# Patient Record
Sex: FEMALE | Race: WHITE | URBAN - METROPOLITAN AREA
[De-identification: names, ages, dates, MRNs, and addresses within clinical notes are randomized per-mention and may not be internally consistent; named-entity substitution may affect disease eponyms.]

---

## 2017-05-10 ENCOUNTER — TELEPHONE (OUTPATIENT)
Dept: BEHAVIORAL HEALTH | Facility: CLINIC | Age: 42
End: 2017-05-10

## 2017-05-10 NOTE — TELEPHONE ENCOUNTER
5-10-17 Client called to schedule Gambling Eval scheduled for 5-16-17 @ 1300.  She will need assistance filling out forms due to Parkinson's. Génesis AKINS Said this would be no problem, there is very little paperwork.    [place], [place]  No Gambling  attends [employer]ambling [employer]    Every month, once a month $1,000 each time.  For several years now.    Legal: Bankruptcy, recently     Mh: Depression, Anxiety  Therapist: Joon And Miguelinaoc.  Meds: Sertraline, and ??  Psychiatrist: Dr Amilcar Pelletier  In [place]. Has appt. Tomorrow.  Medical: Parkinsons    Requested bennies.fb

## 2017-05-16 ENCOUNTER — BEH TREATMENT PLAN (OUTPATIENT)
Dept: BEHAVIORAL HEALTH | Facility: CLINIC | Age: 42
End: 2017-05-16

## 2017-05-16 ENCOUNTER — HOSPITAL ENCOUNTER (OUTPATIENT)
Dept: BEHAVIORAL HEALTH | Facility: CLINIC | Age: 42
Discharge: HOME | End: 2017-05-16
Attending: SOCIAL WORKER | Admitting: SOCIAL WORKER
Payer: COMMERCIAL

## 2017-05-16 PROCEDURE — 90791 PSYCH DIAGNOSTIC EVALUATION: CPT

## 2017-05-16 ASSESSMENT — ANXIETY QUESTIONNAIRES
1. FEELING NERVOUS, ANXIOUS, OR ON EDGE: SEVERAL DAYS
5. BEING SO RESTLESS THAT IT IS HARD TO SIT STILL: NOT AT ALL
7. FEELING AFRAID AS IF SOMETHING AWFUL MIGHT HAPPEN: NOT AT ALL
4. TROUBLE RELAXING: MORE THAN HALF THE DAYS
3. WORRYING TOO MUCH ABOUT DIFFERENT THINGS: NEARLY EVERY DAY
6. BECOMING EASILY ANNOYED OR IRRITABLE: NEARLY EVERY DAY
GAD7 TOTAL SCORE: 11
2. NOT BEING ABLE TO STOP OR CONTROL WORRYING: MORE THAN HALF THE DAYS

## 2017-05-16 ASSESSMENT — PAIN SCALES - GENERAL: PAINLEVEL: NO PAIN (0)

## 2017-05-16 NOTE — PROGRESS NOTES
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 criteria for alcohol/drug use disorder have been met at any time during a period of 12 months or longer (with the exception that Criterion A4,  Craving or strong desire or urge to use alcohol/drug  may be met).   Specify if:   This additional specifier is used if the individual is in an environment where access to alcohol is restricted.    Mild: Presence of 2-3 symptoms    Moderate: Presence of 4-5 symptoms    Severe: Presence of 6 or more symptoms    SOGS: 12 DIGS: 9 CAGE-AID: 0 GILBERTO-7: 11 PHQ-9: 9       COLLATERAL DATA:  Collateral data was gathered from her mother via tel. Her mother reported that Belem's   her about a year or so after he she was diagnosed with Parkinson's disease. Prior to the divorce Belem was spending an unknown amount of time gambling. She did go through bankruptcy after the divorce because of her gambling as well as poor money management by she and her ex. Her mom did manage her money for a long time from about 2014 - 2016, because Belem has always been a poor . He mother found that very stressful and did not want to do that again. They have now worked out a plan with the aid of her counselor wherein Belem will come to her mother's 1x a month,  "pay all her bills and then be given a very modest allowance for living expenses.That way she can be held accountable by her mother, yet her mother doesn't have to do the bill paying. Mom reported that Belem gets $1800 a month after taxes from long term disability and $800 a month from spousal support. Mom said that Belem owes her about $5k. Her mother says that her ex-bf, Romaine, was a real \"salt of the earth\" shahab who really loved Belem but broke of the relationship because her gambling was a threat to his 20 years of sobriety from alcohol. It is uncertain if he will still be interested if Belem gets clean.     Her mom was concerned because Belem has so much unstructured time in her life. Maggie is concerned that Belem doesn't eat, sleep or exercise as much as she should in order to better manage her Parkinson's disease. Her mom feels that she still hasn't fully accepted her illness, and the Parkinson's can cause her to be more depressed. Her mom sees Belem's biggest relapse triggers as: loneliness, boredom, not accepting her Parkinson's disease, poor money management skills and access to money.     Mental Status Assessment    Physical Appearance/Attire:  Appears stated age, left eye doesn't track right eye  Hygiene:  well groomed  Eye Contact:  at examiner  Speech:  regular  Speech Volume:  regular  Speech Quality: fluid  Cognitive/Perceptual:  reality based  Cognition:  memory intact   Judgment:  intact  Insight:  intact  Orientation:  time, place, person and situation  Thought:  logical   Hallucinations:  none  General Behavioral Tone:  cooperative  Psychomotor Activity:  no problem noted  Gait:  no problem  Mood:  normal  Affect:  congruence/appropriate      Vulnerable Adult Checklist for OUTPATIENTS     1.  Do you have a physical, emotional or mental infirmity or dysfunction?       Yes (explain) - Parkinson's.    2.  Does this issue impair your ability to provide for your own care without help, including providing " yourself with food, shelter, clothing, healthcare or supervision?       No    3.  Because of this issue, I need assistance to protect myself from maltreatment by others.      No    Based on the above information:    This person is not a functional Vulnerable Adult according to [place] Statute 626.5572 subdivision 21.             This person has a history of abuse, but is assessed as stable and not in need of an individual abuse prevention plan beyond the program abuse prevention plan.    Category Severity (ICD-10 Code / DSM 5 Code)   Gambling Disorder Severe  (F63.0) (312.31)   Alcohol Use Disorder NA   Cannabis Use Disorder NA   Hallucinogen Use Disorder NA   Inhalant Use Disorder NA   Opioid Use Disorder NA   Sedative, Hypnotic, or Anxiolytic Use Disorder NA   Stimulant Related Disorder NA   Tobacco Use Disorder NA   Other (or unknown) Substance Use Disorder NA     Suicide Screening Questions:    1. Are you feeling hopeless about the present/future?   No  Feeling ashamed of her gambling   2. Have you ever had thoughts about taking your life?   No   3. When did you have these thoughts? NA   4. Do you have any current intent or active desire to take your life?   No   5. Do you have a plan to take your life?    No   6. Have you ever made a suicide attempt?   No   7. Do you have access to pills, guns or other methods to kill yourself?   No       Risk Status - Use as Guide/Example    Ideation - Active  Thoughts of suicide Intent to follow  Through on suicide Plan for completing  suicide    Yes No Yes No Yes No   Emergent X  X  X    Urgent / Non-Emergent X  X   X   Non-Urgent X   X  X   No Current / Active Risk (Past 6 Months)  X  X  X   Belem Orozco Ibrahim No No No       Additional Risk Factors: Significant history of having untreated or poorly treated mental health symptoms  A recent loss that was significant to the patient, i.e. loss of job, loss of home, divorce, break-up, etc.  Parkinson's disease which lead to her  "quitting work and going on long term disability.    Protective Factors:  Having people in his/her life that would prevent the patient from considering committing suicide (i.e. young children, spouse, parents, etc.)     Risk Status:    Emergent? No  Urgent / Non-Emergent?  No  Present / Non- Urgent? Yes, Document in Epic / SBAR to counselor, Collaborate with patient / client to develop \"Patient Safety Plan\", Referral to PCP or psychiatrist, Address in Treatment Plan, Continuous monitoring, assessment and intervention and Address in Discharge / Transition Plan      No Current Risk? See above    Additional information to support suicide risk rating: See Above    Summary of ASAM Placement Criteria      Intoxication and Withdrawal: 0  Biomedical:  1  Emotional and Behavioral:  2  Readiness to Change:  1  Relapse Potential: 3  Recovery Environmental:  2      Evaluation Summary and Plan   's Recommendation    Abstain from all forms of gambling, including \"free\" games , and online/melody games.  Refrain from entering all types of caty establishments.  Self-ban with the help of a trusted other from all local casinos/card rooms, cut up players cards and have all gambling mail stopped.  Abstain from all mood-altering chemicals unless prescribed by a licensed provider.  Complete the evening outpatient compulsive gambling treatment program at [facility].   Complete Orientation and begin group on Thursday 5/18/17.  Attend [place] 12-step, cultural, spiritual, other supportive community meeting on a weekly basis.   Have someone you check in with weekly who will hold you accountable.   Remain law abiding and follow all recommendations of the courts/PO.      Initial problem list:    The patient lacks relapse prevention skills  The patient has poor coping skills  The patient has poor refusal skills   The patient lacks a sober peer support network  The patient has a tendency to isolate  The patient has dual issues of " MI and CD  The patient lacks the ability to effectively manage his/her mental health issues  The patient has a significant history of grief and loss issues. Primarily regarding the recent loss of her BD of 1.5 years.   The patient has a significant history of guilt and shame issues  The patient needs to develop more skillful ways of managing he Parkinson's disease, perhaps join a Parkinson's support group.  The patient could develop better money management skills.

## 2017-05-16 NOTE — TELEPHONE ENCOUNTER
5/16/2017 Patient will be attending gambling group Tuesday, Wednesday, and Thursday evenings.     Patient will start Phase I Problem Gambling group on 05/18/17.  Please add 35 group sessions.    Belem has [employer] MA, 100% coverage for gambling, no auth, Gambling Stefan requested if needed.     SBAR    Name:   Belem Ibrahim YOB: 1975 Age:  41 year old Gender:  female   Insurance:   [employer]/ Stefan as needed   Precipitating Event:   Relapsed 11/16, BF recently left her because of her gambling problem   DOC:   slots  Additional abused substances:   NA   Medical:   Parkinson's   Mental Health:   Depression, Anxiety and recent grief over loss of BF   Previous Treatments:  No prior IP detoxification admission(s).  No prior CD treatment(s).   Psychosocial:   and Single, in no serious relationship  No children  Stable housing and no concerns  Minimal support network and Tendency to isolate from others   Suicide Screening Questions:     1. Are you feeling hopeless about the present/future? No Feeling ashamed of her gambling   2. Have you ever had thoughts about taking your life? No   3. When did you have these thoughts? NA   4. Do you have any current intent or active desire to take your life? No   5. Do you have a plan to take your life?  No   6. Have you ever made a suicide attempt? No   7. Do you have access to pills, guns or other methods to kill yourself? No                  Risk Status - Use as Guide/Example     Ideation - Active  Thoughts of suicide Intent to follow  Through on suicide Plan for completing  suicide    Yes No Yes No Yes No   Emergent X   X   X     Urgent / Non-Emergent X   X     X   Non-Urgent X     X   X   No Current / Active Risk (Past 6 Months)   X   X   X   Belem Ibrahim No No No         Additional Risk Factors: Significant history of having untreated or poorly treated mental health symptoms  A recent loss that was significant to the patient, i.e. loss of job, loss of  "home, divorce, break-up, etc.  Parkinson's disease which lead to her quitting work and going on long term disability.    Protective Factors:  Having people in his/her life that would prevent the patient from considering committing suicide (i.e. young children, spouse, parents, etc.)      Risk Status:     Emergent? No  Urgent / Non-Emergent?  No  Present / Non- Urgent? Yes, Document in Epic / SBAR to counselor, Collaborate with patient / client to develop \"Patient Safety Plan\", Referral to PCP or psychiatrist, Address in Treatment Plan, Continuous monitoring, assessment and intervention and Address in Discharge / Transition Plan   No Current Risk? See above     Additional information to support suicide risk rating: See Above     Additional Info as needed: NA               "

## 2017-05-17 ASSESSMENT — ANXIETY QUESTIONNAIRES: GAD7 TOTAL SCORE: 11

## 2017-05-17 ASSESSMENT — PATIENT HEALTH QUESTIONNAIRE - PHQ9: SUM OF ALL RESPONSES TO PHQ QUESTIONS 1-9: 9

## 2017-05-18 ENCOUNTER — HOSPITAL ENCOUNTER (OUTPATIENT)
Dept: BEHAVIORAL HEALTH | Facility: CLINIC | Age: 42
End: 2017-05-18
Attending: SOCIAL WORKER
Payer: COMMERCIAL

## 2017-05-18 PROCEDURE — 90853 GROUP PSYCHOTHERAPY: CPT

## 2017-05-18 PROCEDURE — 90832 PSYTX W PT 30 MINUTES: CPT

## 2017-05-18 NOTE — PROGRESS NOTES
D.  Patient attended Problem Gambling IOP orientation.  Video was watched, assignment notebook was given, and orientation forms were signed. A. Pt understood orientation and motivated to begin treatment.  P.  Begin attending group and  complete assignments.    Génesis Anand MS, [facility], [facility]

## 2017-05-19 NOTE — PROGRESS NOTES
"5/18/2017 5:30-7:30pm D. Patient participated in their first Intensive Outpatient Problem Gambling group. Group participated in a guided meditation.  Introductions and Check-in were done, another group member also started this evening.   Pt shared a little about what brought her to group and cried when she talked about being diagnosed with parkinson's as such a young age, her divorce, and needing to rebuild trust with her mother.  Group went over and discussed group rules and how to give feedback.   Patient did not have an assignment to present but listened to others give feedback to group members who presented their assignments.  Group was given a list of 100 Virtues and how to practice virtues.  They were given the chance to pick one of the virtues that they saw as a strength they already have and another virtue they could work on growing and discuss each with the group.  Group closed with reading on \"Confidence\".  I. Writer facilitated discussion.  A. Pt seemed to understand the group rules. Patient was able to share how their virtues were not being practiced and how some virtues remained in tact, though often less strong as they once were due to their gambling. P. Pt. Implement information into recovery and begin working on assignments.     Génesis Anand MS, [facility], [facility]  "

## 2017-05-23 ENCOUNTER — HOSPITAL ENCOUNTER (OUTPATIENT)
Dept: BEHAVIORAL HEALTH | Facility: CLINIC | Age: 42
End: 2017-05-23
Attending: SOCIAL WORKER
Payer: COMMERCIAL

## 2017-05-23 PROCEDURE — 90853 GROUP PSYCHOTHERAPY: CPT

## 2017-05-24 ENCOUNTER — HOSPITAL ENCOUNTER (OUTPATIENT)
Dept: BEHAVIORAL HEALTH | Facility: CLINIC | Age: 42
End: 2017-05-24
Attending: SOCIAL WORKER
Payer: COMMERCIAL

## 2017-05-24 PROCEDURE — 90853 GROUP PSYCHOTHERAPY: CPT

## 2017-05-24 NOTE — PROGRESS NOTES
Acknowledgement of Current Treatment Plan       I have reviewed my treatment plan with my therapist / counselor on 05/25/17. I agree with the plan as it is written in the electronic health record.    Name Signature   Belem Ibrahim    Name of Therapist / Counselor    Génesis Anand MS, [facility], [facility]

## 2017-05-24 NOTE — PROGRESS NOTES
[facility]  Adult Gambling Program  Treatment Plan Requirements    These services are provided by the facility for each patient/client according to the individual's treatment plan:    Individual and group counseling    Education    Transition services    Services to address any co-occurring mental illness    Service coordination    Initial Treatment Plan Goals if noted in plan:  1. Complete all the requirements of Program Orientation.  2. Maintain medication compliance throughout the program.  3. Complete gambling therapy for identified issues on your problem list.  4. Gain family involvement in treatment process to address family issues from the problem list.  5. Attend and participate in all required group(s) per individual treatment plan.  6. Focus attention to individualized issues from the treatment plan.  7. Schedule a physical examination if recommended.    In addition to the above, complete all individual goals as specifically outlines on your treatment plan.    Criteria for discharge:  Patients/clients are discharged from the program following completion of the entire program including Phase I and II or acceptance of other post-treatment referrals to mental health providers, or aftercare at other facilities.  Patients/clients may also be discharged for inappropriate behavior, chemical use or gambling.      Favorable Discharge - Patients/clients have completed agreed upon treatment goals, understand their diagnosis and appear motivated about the follow-up care.    Guarded Discharge - Patients/clients have demonstrated some understanding of their diagnosis and recovery process, and have completed some of their treatment goals.  This prognosis also includes patients/clients who have completed some treatment goals but have not made commitment to community support or follow through with referrals.    Unfavorable Discharge - Patients/clients have not completed agreed upon treatment  goals due to their own choice, have limited understanding of their diagnosis, and have shown minimal or inconsistent behavior conducive to recovery.  Those patients/clients discharged due to behavioral problems will also be unfavorable discharges.                                  Adult Gambling Treatment Plan     Name:  Belem Ibrahim  MR# [id number]  :  1975    41 year old female    Acute Intoxication/Withdrawal Potential      DIMENSION 1  RISK FACTOR: 0      Assignment Date  Source  Prob/Goal/  Intervention  Target  Date  Initials  Outcome  Completion  Date    2017 Self - Current, History - Current and Assessment - Current   Problem: Pt did not appear to be under the influence or in withdrawal on the date of assessment.  Goal: Limit, if not abstain, from alcohol, and abstain from all other mood altering chemicals due to history of addiction and/or concern of cross addiction.  Intervention: Report to counselor and group any concern or abuse with alcohol or any drug use during treatment.   Handout:  Rethinking Drinking           Ongoing    orientation SC           Effective    Effective           17     Biomedical Conditions and Complaints      DIMENSION 2  RISK FACTOR: 1       Assignment Date  Source  Prob/Goal/  Intervention  Target  Date  Initials  Outcome  Completion  Date    2017 Self - Current, History - Current and Assessment - Current   Problem: Patient has a medical diagnosis of  Parkinson s Disease.  Goal: Follow recommendations of medical provider.  Intervention: Continue to take prescribed medications and follow-up with medical interventions while in program.    2017 and ongoing  SC         Effective         17     Emotional/Behavioral/Cognitive Conditions and Complications     DIMENSION 3  RISK FACTOR:  2           Assignment Date Source Prob/Goal/  Intervention Target  Date Initials Outcome Completion  Date   2017 Self - Current, History - Current and  Assessment - Current   Problem: Problem: Patient's self-esteem has been negatively impacted due to gambling and its consequences.  Goal: To clarify self-concept.  Recognize how this has affected behaviors and how it can affect recovery.  Intervention:  Handout:  Test of Self-Conscious Affect, Version 3 (TOSCA-3S).  Complete and discuss outcome in group.  Handout:  Explore the Real you, Ch. 21,  Who are you Ch. 22             In Group  Week S SC Not completed at time of discharge    5/24/2017 Self - Current, History - Current and Assessment - Current   Problem: Patient lacks an understanding of gambling as a disease.  Goal:  Gain awareness of gambling as a chronic, progressive, incurable (but treatable) disease.  Intervention:  NAC/Vivitrol/Naltrexone Discussion:  Handout:  Gambling and the Brain  Handout:  Can t stop, won t stop: Feeling impulsive, compulsive, or addicted  Handout:  Treatment of Pathological gambling with naltrexone pharmacotherapy and brief intervention: a  study  Movie:  Disease of compulsive gambling             Orientation        In group  In Group SC             Effective      Not completed at time of discharge             05/18/17 5/24/2017 Self - Current, History - Current and Assessment - Current   Problem: Patient has history of grief, loss, or abandonment.  Goal:  Gain understanding of the grieving process of people, events, and the addiction.  Intervention:  Handout: The Grieving Process, Chapter 27:  Grief and loss  Handout:  My stages of grief  and Grief and loss letter  Individual: If suggested during group, find and begin working with a grief group or grief counselor.             Week O    Individual SC Not completed at time of discharge    5/24/2017 Self - Current, History - Current and Assessment - Current   Problem:  Pt reports racing thoughts and brain constantly in action.  Goal:  To learn to self soothe and calm the racing thoughts.  Intervention:  Introduction to  Meditation, Mindfulness, yoga, and stress reduction through group meditation, coloring pages, and other coping skills shared in group.  Introduction to yoga, meditation, and mindfulness  Handout: Mindfulness for Gambling Addictions: Does it work?  Handout: 4-7-8 Breath Relaxation Exercise  Mandala Coloring meditation, breathing exercise, and discussion of mindfulness  Meditation at the beginning of each group session                 In group      In group SC               Not completed at time of discharge    Effective                       07/13/17 5/24/2017 Self - Current, History - Current and Assessment - Current   Problem:  Patient reports history of shame  Goal:  To understand characteristics of shame.   Intervention:  Movie:  Chris Putnam: Listening to Shame  Handout:  Difference between guilt and shame & Self Love       In group  In group SC     Not completed at time of discharge    5/24/2017 Self - Current, History - Current and Assessment - Current   Problem: Patient struggles to have open and clear communication with others.  Goal:  Increase understanding of clear communication styles  Intervention:  Handout:  Communication Roadblocks  Handout:  Communication Styles  Handout:  Assertiveness Training  Group work:  Communication Styles in Action           Week F    In Group SC       Not completed at time of discharge    5/24/2017 Self - Current, History - Current and Assessment - Current   Problem: Patient struggles with suicidal ideation, past suicide attempts, or is diagnosed with an addiction that has a high rate of suicidal ideation.  Goal:  Increase awareness of signs of growing distress and interventions to decrease suicidal ideation  Complete  Patient Safety Plan Template          Intake or   In Group SC           Effective           06/20/17     Readiness to Change     DIMENSION 4  RISK FACTOR: 1             Assignment Date Source Prob/Goal/  Intervention Target  Date Initials Outcome  Completion  Date   5/24/2017 Self - Current, History - Current and Assessment - Current   Problem: Problem: Lack of understanding the illness of compulsive gambling and life areas impacted.   Goal: To understand the illness of compulsive gambling and to examine all areas affected.   Intervention:  Movie:  Big Eliseo           In Group SC           Effective           06/01/17 5/24/2017 Self - Current, History - Current and Assessment - Current   Problem: Has a combination of internal and external motivation preceding treatment admission.                       Goal:  Increase internal motivation to remain sober.  Be able to utilize external sources on days when it is harder to stay clean for you  Intervention:  Handout:  Setting and Pursuing Goals in Recovery  Handout:  Chapter 4:  Stages of Change  Handout:  Stages of Change Model  Handout:  Treatment Topic Two:  Strengthening your commitment to change  Ongoing:  Weekly check in sheet               Week M            In group SC           Not completed at time of discharge            Effective                           07/13/17 5/24/2017 Self - Current, History - Current and Assessment - Current   Problem:  Patient is disconnected from their Humanity, values, and virtues.  Goal:  Decrease negative self-talk, increase sense of purpose in life and their value to society.  Intervention:  Handout:  Chapter 18:  Values  Handout:  Values: Self Exploration  Handout:  5 values compromised during  active gambling  Handout:  How to speak the language of Virtues & Virtues list and in group assignment  Group :  Weekly reading on Virtues           Week K      In group    In Group SC           Not completed at time of discharge      Effective                     07/13/17     Relapse/Continues Use/Continues Problem Potential     DIMENSION 5  RISK FACTOR: 3               Assignment Date Source Prob/Goal/  Intervention Target  Date Initials Outcome Completion  Date   5/24/2017  Self - Current, History - Current and Assessment - Current   Problem:  Patient has poor relationship with money, large amounts of money gambled, and financial stressors because of gambling.  Goal: Remove all access to money and involvement in financial matters, protect assets.  reduce financial stress and gain insight into money relationship    Intervention:  Handout:  Chapter 16:  Emotional Meaning of Money  Handout:  Chapter 17:  Money and Finances  Speaker:   Family Means comes to speak     ASAP and ongoing        Week G    In Group SC       Effective        Not completed at time of discharge       05/18/17 5/24/2017 Self - Current, History - Current and Assessment - Current   Problem: Patient lacks insight into their personal relapse process.  Goal:  Gain awareness of gambling as a chronic, progressive, incurable (but treatable) disease.  Intervention:  Handout:  Personal Recovery Planning  Handout:  Relapse Prevention Planning  Handout:  Early Warning Signs of Relapse           Week B  Week Q   SC           Effective  Effective           05/25/17 05/25/17 5/24/2017 Self - Current, History - Current and Assessment - Current   Problem:  Patient lacks an understanding of relapse triggers and cues.  Goal:  Gain an understanding of events, places, and other variables can trigger a relapse.  Intervention:  Handout:  Trigger Inventory           Week O SC           Effective           05/25/17 5/24/2017 Self - Current, History - Current and Assessment - Current   Problem:  Patient has a history of errors in thinking, using justification, rationalization, and other irrational thinking to justify gambling.  Goal:   Increase awareness of thinking errors, strengthen rational thought process.  Intervention:  Handout:  The cognitive model  Handout:  The Ten Forms of Twisted Thinking  Handout:  Correcting Disordered Thinking  Reading: Petar Baby and discuss with group             In Group    Week V  In group SC            Not completed at time of discharge    Effective                   06/08/17        Recovery Environment     DIMENSION 6  RISK FACTOR: 2                 Assignment Date Source Prob/Goal/  Intervention Target  Date Initials Outcome Completion  Date     5/24/2017 Self - Current, History - Current and Assessment - Current   Problem: Lack of a sustained relationship with a higher power of your understanding.   Goal: Develop and nurture relationship with a higher power of your understanding  Intervention:  Handout:  Understanding Spirituality  Borrow and return books:  Conscious Contact & Toward Spirituality  Handout:  Purpose  Ongoing:   Present 5 ways your higher power can help you in recovery               Week I SC           Not completed at time of discharge    5/24/2017 Self - Current, History - Current and Assessment - Current   Problem: Patient lacks a sober support system.  Goal: Develop a strong network of people in recovery, introduction to Gamblers Anonymous, & learn alternatives to GA.  Intervention:  Group work:  Alternatives to the 12 steps  Journal:  Identify GA/support meetings you can attend  Handout:  GA book           In Group    Orientation SC           Effective    Effective           05/24/17 05/18/17 5/24/2017 Self - Current, History - Current and Assessment - Current   Problem: Relationships with family/concerned persons have been strained through use and related behaviors  Goal:  Begin healing damaged relationships.  Allow adequate time for healing to occur  Intervention:  Handout:  Treatment Topic Five:  Healing Relationships  Handout:  Family Ch. 24, Relationships Ch. 25  Family Group           Week I    Phase I SC     Not completed at time of discharge      Effective               07/13/17 5/24/2017 Self - Current, History - Current and Assessment - Current   Problem:  Patient struggles with finding balance in life and handling daily stress.  Goal:  Discover a proper balance  between self and life responsibilities  Intervention:  Handout:  Chapter 32/33:  Leisure and Balance  Handout:  Life Balance Wheel           Week F SC   Not completed at time of discharge    5/24/2017 Self - Current, History - Current and Assessment - Current   Problem:  Patient struggles with ongoing difficulties due to her Parkinson s disease.  Goal:  Gain insight into the impact of physical health on problem gambling.  Intervention:  Consider attending a Parkinsons support group.  Learn coping skills to manage pain and disability ongoing.       By discharge SC   Not completed at time of discharge          5/24/2017 Self - Current, History - Current and Assessment - Current   Problem: Patient has suffered the effects of problem gambling  Goal: Complete Treatment with a plan for long term recovery.  Intervention:   Handout:  Planning Aftercare  Handout: How far have I come  Continued Care for Long Term Recovery       Week V    Discharge SC     Not completed at time of discharge        All interventions that are designated as  current  will need to be completed in order to transition out of treatment with a favorable prognosis. The treatment plan is a flexible document and a work in progress. Interventions and goals may be added at any time to customize plan to each individual s needs. Client may work with therapist to change interventions as long as they pertain to the goals stipulated in the plan and/or are clinically driven.

## 2017-05-24 NOTE — PROGRESS NOTES
D) Pt met with Problem Gambling counseling staff to review treatment plan.   Client Treatment goal list:  1. Trust  2. Inner peace  3. Lonliness  A) Pt signed treatment plan and appeared motivated to begin working on problems identified and discussed.   P) Pt to continue in group and working on assignments.  Génesis Anand MS, [facility]

## 2017-05-24 NOTE — PROGRESS NOTES
"5/23/2017 5:30-7:30pm D. Patient participated in the Intensive Outpatient Problem Gambling group. Group participated in a guided meditation.  Introductions and Check-in were done.  Pt shared highs and lows for the week and any struggles with recovery. Due to patient not being able to write easily, it was set up that she would call in her Weekly Group Inventory to counselors voice mail prior to group.  Patient did not report suicidal ideation or gambling this week.  Patient did report spending time with mom and still struggling with grief from her relationship ending.  Patient did not have an assignment to present, but listened to another group member presenting assignments and the feedback they received.  The group completed the assignment \"The alternatives to the 12 Steps\", allowing patients to create their own 12 steps using various options from the internet.  Group members shared the rewording that they created. Group closed with reading on \"Courage\".  I. Writer facilitated discussion.  A. Pt seemed to appreciate looking at the 12 steps as a living document and seeing how it can be written to be more strength based.  P. Pt. Implement information into recovery and continue working on assignments.  Her goal for the week is to get a GA sponsor.    Génesis Anand, MS, LADC, [facility]-II  "

## 2017-05-25 ENCOUNTER — HOSPITAL ENCOUNTER (OUTPATIENT)
Dept: BEHAVIORAL HEALTH | Facility: CLINIC | Age: 42
End: 2017-05-25
Attending: SOCIAL WORKER
Payer: COMMERCIAL

## 2017-05-25 PROCEDURE — 90853 GROUP PSYCHOTHERAPY: CPT

## 2017-05-25 PROCEDURE — 90837 PSYTX W PT 60 MINUTES: CPT

## 2017-05-25 NOTE — PROGRESS NOTES
Patient came in early to work on assignments with counselor.  Discussed getting a therapy dog and gave her information to bring to her doctor.  Worked on assignments she can present in group.    Génesis Anand MS, [facility], [facility]II

## 2017-05-25 NOTE — PROGRESS NOTES
"05/24/2017   D) Belem shared that group is all new to her but she is happy to be here. She participated in a discussion of the topic of\"shame\". I) Facilitated group. A) Belem seems to be seeking support. P) Invite mother to the Family Program.  "

## 2017-05-26 NOTE — PROGRESS NOTES
"5/25/2017 5:30-7:30pm D. Patient participated in the Intensive Outpatient Problem Gambling group. Group participated in a guided meditation.  Introductions and Check-in were done.  Pt shared highs and lows for the week and any struggles with recovery.  Pt shared any updates since last group.  Patient presented \"Trigger Inventory\", \"Relapse Prevention Planning\", & \"Early Warning Signs of Relapse\".  Patient gave information she could do to handle triggers and relapse warning signs.  Patient seems to be isolated from others except for her mother.  Patient accepted feedback from peers.  Group held a discussion on \"Petar Baby\" for those who had read the booklet, and others were given the booklet to read.  Group closed with reading on \"Service\".  I. Writer facilitated discussion.  A. Pt seemed to be working a plan of recovery and learning from group members shared experiences.  P. Pt. Implement information into recovery and continue working on assignments.     Génesis Anand, MS, LADC, [facility]-II  "

## 2017-06-06 ENCOUNTER — HOSPITAL ENCOUNTER (OUTPATIENT)
Dept: BEHAVIORAL HEALTH | Facility: CLINIC | Age: 42
End: 2017-06-06
Attending: SOCIAL WORKER
Payer: COMMERCIAL

## 2017-06-06 PROCEDURE — 90853 GROUP PSYCHOTHERAPY: CPT

## 2017-06-07 ENCOUNTER — HOSPITAL ENCOUNTER (OUTPATIENT)
Dept: BEHAVIORAL HEALTH | Facility: CLINIC | Age: 42
End: 2017-06-07
Attending: SOCIAL WORKER
Payer: COMMERCIAL

## 2017-06-07 PROCEDURE — 90853 GROUP PSYCHOTHERAPY: CPT

## 2017-06-07 NOTE — PROGRESS NOTES
6/6/2017 5:30-7:30pm D. Patient participated in the Intensive Outpatient Problem Gambling group. Group participated in a guided meditation.  Introductions and Check-in were done. Pt shared highs and lows for the week and any struggles with recovery.  Pt shared enjoying the weekend with family. Tuesday group completed the Weekly Group Inventory asking about gambling and suicidal ideation during the past week.  Patient did not report suicidal ideation or gambling this week.  Patient did not have an assignment to present but listened to feedback to group members who presented their assignments.   Group closed with coining out a long term group member.  Patient was able to share what was gained from group member and hear back from leaving member about leaving group.  I. Writer facilitated discussion.  A. Pt seemed to be gaining an understanding of how gambling has impacted all areas of their life and how to best reach sustainable recovery. P. Pt. Implement information into recovery and continue working on assignments.     Génesis Anand MS, [facility], [facility]-II

## 2017-06-08 NOTE — PROGRESS NOTES
"06/07/2017  D) Belem watched a video on \"trust\".  Discussed it with the group. I) Facilitate group. A) Belem seems to be seeking support. P) Invite her mother to Family Group.   "

## 2017-06-20 ENCOUNTER — HOSPITAL ENCOUNTER (OUTPATIENT)
Dept: BEHAVIORAL HEALTH | Facility: CLINIC | Age: 42
End: 2017-06-20
Attending: SOCIAL WORKER
Payer: COMMERCIAL

## 2017-06-20 PROCEDURE — 90853 GROUP PSYCHOTHERAPY: CPT

## 2017-06-21 NOTE — PROGRESS NOTES
"6/20/2017 5:30-7:30pm D. Patient participated in the Intensive Outpatient Problem Gambling group. Group participated in a guided meditation.  Introductions and Check-in were done.  Pt shared highs and lows for the week and any struggles with recovery.  Pt shared how she had hurt her  skating and had to miss group to recover. Tuesday group completed the Weekly Group Inventory asking about gambling and suicidal ideation during the past week.  Patient did not report suicidal ideation or gambling this week. Patient did not have an assignment to present.  Group members completed the \"Patient Safety Plan\" and gave the original to the counselor.  Copy will be made for patient and original for the case file.  I. Writer facilitated discussion.  A. Pt seemed to gain an understanding of the severity of problem gambling and the high risk of suicide in the population.  Safety plan can be used when there are urges to lacy as it is steps from using a distraction to asking for professional help. P. Pt. Implement information into recovery and continue working on assignments.     Génesis Anand MS, [facility], [facility]II  "

## 2017-07-05 ENCOUNTER — HOSPITAL ENCOUNTER (OUTPATIENT)
Dept: BEHAVIORAL HEALTH | Facility: CLINIC | Age: 42
End: 2017-07-05
Attending: SOCIAL WORKER
Payer: COMMERCIAL

## 2017-07-05 PROCEDURE — 90853 GROUP PSYCHOTHERAPY: CPT

## 2017-07-06 NOTE — PROGRESS NOTES
07/05/2017 5:30-7:30 D) Belem shared that she was experiencing a lot of sadness over losing her relationship with her boyfriend because of her gambling. Being in Family Group with couples who were trying to work on their relationships reminded her of he loss. I) Facilitate group. A) Belem seems to be seeking support. P) Invite her mother to Family Group.

## 2018-10-29 ENCOUNTER — TELEPHONE (OUTPATIENT)
Dept: CARE COORDINATION | Facility: CLINIC | Age: 43
End: 2018-10-29

## 2018-10-29 NOTE — TELEPHONE ENCOUNTER
"Emergency Social Work Services Note    Date of  Intervention: 10/29/18  Last Emergency Department Visit: Has been seen through the [facility]  Care Plan:  none  Collaborated with:  Patient's mother     Data: Belem Ibrahim is a 42-year-old female with a Parkinson's diagnosis who is currently at the [facility].  Today, her mother contacted [facility] ED to discuss resources and had questions about how to change her daughter's TCU placement.  Please note, Belem has not been seen in our ED recently nor is she followed by [facility] clinic staff.  Mother apparently contacted our ED because she is considering having her daughter's care transferred to [facility] neurology clinic.    Intervention: ED SW spoke at length with Belem's mother to offer general information and resources.  We did not specifically discuss her daughter's medical record as noted we do not have a release of information nor is she followed here through our health system.  Her mother has specific concerns including that TCU is not providing adequate care (not providing enough supervision, also not providing enough PT/OT) and that Belem may need her medications adjusted.  Per her mother, Belem was at [facility] and was subsequently discharge to [facility] in [place]. Per her mother, Belem is MA pending and only had options to discharge to two local TCUs.  Belem's mother is considering \"pulling Belem out of there (TCU)\" and bringing her to a local ED for other options.     ED SW encouraged Belem's mother to work with Belem's current TCU including speaking to their SW, director of nursing and medical director.  Discussed that she will need to have Belem's permission to be involved in her care planning.  Suggested that if she has specific care concerns she may contact the [facility].  Discussed that unfortunately, Belem's facility options are limited since she is MA pending and she does not have an " active  at this time. Bringing Belem to an ED for a new placement would not expand her options at this time and anticipate she would have to return to [facility] [place].  Discussed that once Belem's MA is active, she may have some more options and she should discuss that with U SW for possible transfer. ED SW also provided community resources including disability linkage line, [employer] for [place] (support groups) and suggested she attend her daughter's upcoming neurology appointment this Friday (Dr. Olmos, [facility], [place]).    Assessment:  Patient followed by [facility], currently at [facility]    Plan:    Anticipated Disposition:  Facility:  [facility]    Barriers to d/c plan:  none    Follow Up:  None. Patient's mother will follow-up with outpatient providers.     Patient's mother - Maggie Patrice (p) [phone]    Georgia Davis, CAROLINA, [facility]  Social Work Services, Emergency Dept [facility]  Pager: [phone] Mon-Sat 9 am - 9 pm, on-call/after hours pager [phone]

## 2018-11-29 ENCOUNTER — TRANSFERRED RECORDS (OUTPATIENT)
Dept: HEALTH INFORMATION MANAGEMENT | Facility: CLINIC | Age: 43
End: 2018-11-29

## 2018-11-30 NOTE — TELEPHONE ENCOUNTER
FUTURE VISIT INFORMATION      FUTURE VISIT INFORMATION:    Date: 12/13/18    Time: 9a    Location: [facility]  REFERRAL INFORMATION:    Referring provider:  Dr. Nigel Torres     Referring providers clinic:  [facility]     Reason for visit/diagnosis  Parkinsons    RECORDS REQUESTED FROM:       Clinic name Comments Records Status Imaging Status   [facility] Dr. Torres 10/4/18-10/24/18 admission Care Everywhere    [facility] ER Neurology Dr. Olmos Requested                                11/30/18: Requested records at [facility] ER Neurology.

## 2018-12-13 ENCOUNTER — PRE VISIT (OUTPATIENT)
Dept: NEUROLOGY | Facility: CLINIC | Age: 43
End: 2018-12-13

## 2019-03-25 NOTE — TELEPHONE ENCOUNTER
RECORDS RECEIVED FROM: External - Dr. Nigel Torres - [facility]   DATE RECEIVED: 3/28/19   NOTES (FOR ALL VISITS) STATUS DETAILS   OFFICE NOTE from referring provider Care Everywhere [facility]:  10/4/18-10/24/18   OFFICE NOTE from other specialist Received [facility]:  11/29/18 11/16/18 11/2/18 8/21/18  (additional office visits)   DISCHARGE SUMMARY from hospital Care Everywhere [facility]:  10/4/18-10/24/18   DISCHARGE REPORT from the ER N/A    OPERATIVE REPORT N/A    MEDICATION LIST Care Everywhere    IMAGING  (FOR ALL VISITS)     EMG N/A    EEG N/A    ECT N/A    MRI (HEAD, NECK, SPINE) Received  [facility]:  MRI Brain 8/28/18   CT (HEAD, NECK, SPINE) N/A

## 2019-03-26 NOTE — TELEPHONE ENCOUNTER
Imaging Received  [facility]   Image Type (x): Disc:   PACS: X   Exam Date/Name MRI Brain 8/28/18 Comments: Imaging data services notified to resolve images in PACS

## 2019-03-28 ENCOUNTER — PRE VISIT (OUTPATIENT)
Dept: NEUROLOGY | Facility: CLINIC | Age: 44
End: 2019-03-28

## 2019-05-22 ENCOUNTER — TELEPHONE (OUTPATIENT)
Dept: NEUROLOGY | Facility: CLINIC | Age: 44
End: 2019-05-22

## 2019-05-22 NOTE — TELEPHONE ENCOUNTER
[facility] Call Center    Phone Message    May a detailed message be left on voicemail: yes    Reason for Call: Other: Pt's mom Maggie called back to confirm appt on 5/28 at 9:10a with Dr. Rodgers that [facility] had put on hold for Pt.  They plan to attend. If anything changes, they will call clinic back.     Action Taken: Message routed to:  [facility] Neurology Clinic

## 2019-05-22 NOTE — TELEPHONE ENCOUNTER
"Patient was incorrectly scheduled with Dr. Kumar. Called patient to cancel the appointment and get her scheduled with another provider. Left voice mail.    Spoke to mother, Tangela. Patient was seeing Dr. Webber for years, then he referred her to Dr. Olmos, Dr. Olmos is soon retiring. Mother is beginning to burn out.     Patient was diagnosed in 2008 at the age of 33. Patient was living independently until August of 2018. Mother started noticing psych issues. Patient was exhibiting signs of fear and paranoia, hearing things. Mother took her to [facility] and patient was placed in the psych connors for 1 month. Since that experience the patient has \"gone downhill and never come back to baseline.\"  She is currently living in a TCU.    Patient is scheduled with Dr. Rodgers 5/28 at 9:10. Patient's mother will call back to confirm or cancel after she talks to Dr. Olmos.          "